# Patient Record
Sex: FEMALE | Race: NATIVE HAWAIIAN OR OTHER PACIFIC ISLANDER
[De-identification: names, ages, dates, MRNs, and addresses within clinical notes are randomized per-mention and may not be internally consistent; named-entity substitution may affect disease eponyms.]

---

## 2021-03-07 ENCOUNTER — HOSPITAL ENCOUNTER (OUTPATIENT)
Dept: HOSPITAL 76 - EMS | Age: 56
Discharge: ANOTHER HOSPITAL | End: 2021-03-07
Payer: MEDICAID

## 2021-03-07 DIAGNOSIS — K92.1: Primary | ICD-10-CM

## 2021-12-17 ENCOUNTER — HOSPITAL ENCOUNTER (OUTPATIENT)
Dept: HOSPITAL 76 - EMS | Age: 56
Discharge: ANOTHER HOSPITAL | End: 2021-12-17
Payer: MEDICAID

## 2021-12-17 ENCOUNTER — HOSPITAL ENCOUNTER (EMERGENCY)
Dept: HOSPITAL 76 - ED | Age: 56
Discharge: HOME | End: 2021-12-17
Payer: MEDICAID

## 2021-12-17 VITALS — DIASTOLIC BLOOD PRESSURE: 65 MMHG | SYSTOLIC BLOOD PRESSURE: 118 MMHG

## 2021-12-17 DIAGNOSIS — C50.919: ICD-10-CM

## 2021-12-17 DIAGNOSIS — C78.00: ICD-10-CM

## 2021-12-17 DIAGNOSIS — C79.31: ICD-10-CM

## 2021-12-17 DIAGNOSIS — K94.23: Primary | ICD-10-CM

## 2021-12-17 DIAGNOSIS — E86.0: ICD-10-CM

## 2021-12-17 DIAGNOSIS — D64.9: ICD-10-CM

## 2021-12-17 DIAGNOSIS — D72.829: ICD-10-CM

## 2021-12-17 LAB
ALBUMIN DIAFP-MCNC: 3.3 G/DL (ref 3.2–5.5)
ALBUMIN/GLOB SERPL: 0.9 {RATIO} (ref 1–2.2)
ALP SERPL-CCNC: 126 IU/L (ref 42–121)
ALT SERPL W P-5'-P-CCNC: 25 IU/L (ref 10–60)
ANION GAP SERPL CALCULATED.4IONS-SCNC: 12 MMOL/L (ref 6–13)
AST SERPL W P-5'-P-CCNC: 35 IU/L (ref 10–42)
BASOPHILS NFR BLD AUTO: 0.1 10^3/UL (ref 0–0.1)
BASOPHILS NFR BLD AUTO: 0.3 %
BILIRUB BLD-MCNC: 0.4 MG/DL (ref 0.2–1)
BUN SERPL-MCNC: 46 MG/DL (ref 6–20)
CALCIUM UR-MCNC: 8.6 MG/DL (ref 8.5–10.3)
CHLORIDE SERPL-SCNC: 98 MMOL/L (ref 101–111)
CLARITY UR REFRACT.AUTO: CLEAR
CO2 SERPL-SCNC: 25 MMOL/L (ref 21–32)
CREAT SERPLBLD-SCNC: 1.4 MG/DL (ref 0.4–1)
EOSINOPHIL # BLD AUTO: 0.2 10^3/UL (ref 0–0.7)
EOSINOPHIL NFR BLD AUTO: 0.7 %
ERYTHROCYTE [DISTWIDTH] IN BLOOD BY AUTOMATED COUNT: 19.6 % (ref 12–15)
GFRSERPLBLD MDRD-ARVRAT: 39 ML/MIN/{1.73_M2} (ref 89–?)
GLOBULIN SER-MCNC: 3.8 G/DL (ref 2.1–4.2)
GLUCOSE SERPL-MCNC: 139 MG/DL (ref 70–100)
GLUCOSE UR QL STRIP.AUTO: NEGATIVE MG/DL
HCT VFR BLD AUTO: 24.2 % (ref 37–47)
HGB UR QL STRIP: 7.8 G/DL (ref 12–16)
KETONES UR QL STRIP.AUTO: NEGATIVE MG/DL
LIPASE SERPL-CCNC: 116 U/L (ref 22–51)
LYMPHOCYTES # SPEC AUTO: 0.8 10^3/UL (ref 1.5–3.5)
LYMPHOCYTES NFR BLD AUTO: 2.9 %
MCH RBC QN AUTO: 32.1 PG (ref 27–31)
MCHC RBC AUTO-ENTMCNC: 32.2 G/DL (ref 32–36)
MCV RBC AUTO: 99.6 FL (ref 81–99)
MONOCYTES # BLD AUTO: 1.3 10^3/UL (ref 0–1)
MONOCYTES NFR BLD AUTO: 4.7 %
NEUTROPHILS # BLD AUTO: 25.1 10^3/UL (ref 1.5–6.6)
NEUTROPHILS # SNV AUTO: 28.1 X10^3/UL (ref 4.8–10.8)
NEUTROPHILS NFR BLD AUTO: 89.3 %
NITRITE UR QL STRIP.AUTO: NEGATIVE
NRBC # BLD AUTO: 0.05 X10^3/UL
NRBC # BLD AUTO: 0.2 /100WBC
PDW BLD AUTO: 9.7 FL (ref 7.9–10.8)
PH UR STRIP.AUTO: 7 PH (ref 5–7.5)
PLAT MORPH BLD: (no result)
PLATELET # BLD: 521 10^3/UL (ref 130–450)
PLATELET BLD QL SMEAR: (no result)
POTASSIUM SERPL-SCNC: 4 MMOL/L (ref 3.5–5)
PROT SPEC-MCNC: 7.1 G/DL (ref 6.7–8.2)
PROT UR STRIP.AUTO-MCNC: NEGATIVE MG/DL
RBC # UR STRIP.AUTO: NEGATIVE /UL
RBC MAR: 2.43 10^6/UL (ref 4.2–5.4)
RBC MORPH BLD: (no result)
SODIUM SERPLBLD-SCNC: 135 MMOL/L (ref 135–145)
SP GR UR STRIP.AUTO: 1.01 (ref 1–1.03)
SQUAMOUS URNS QL MICRO: (no result)
UROBILINOGEN UR QL STRIP.AUTO: (no result) E.U./DL
UROBILINOGEN UR STRIP.AUTO-MCNC: NEGATIVE MG/DL
WBC # UR MANUAL: (no result) /HPF (ref 0–5)

## 2021-12-17 PROCEDURE — 81003 URINALYSIS AUTO W/O SCOPE: CPT

## 2021-12-17 PROCEDURE — 96361 HYDRATE IV INFUSION ADD-ON: CPT

## 2021-12-17 PROCEDURE — 99284 EMERGENCY DEPT VISIT MOD MDM: CPT

## 2021-12-17 PROCEDURE — 83605 ASSAY OF LACTIC ACID: CPT

## 2021-12-17 PROCEDURE — 87086 URINE CULTURE/COLONY COUNT: CPT

## 2021-12-17 PROCEDURE — 74177 CT ABD & PELVIS W/CONTRAST: CPT

## 2021-12-17 PROCEDURE — 71045 X-RAY EXAM CHEST 1 VIEW: CPT

## 2021-12-17 PROCEDURE — 74019 RADEX ABDOMEN 2 VIEWS: CPT

## 2021-12-17 PROCEDURE — 85025 COMPLETE CBC W/AUTO DIFF WBC: CPT

## 2021-12-17 PROCEDURE — 83690 ASSAY OF LIPASE: CPT

## 2021-12-17 PROCEDURE — 81001 URINALYSIS AUTO W/SCOPE: CPT

## 2021-12-17 PROCEDURE — 80053 COMPREHEN METABOLIC PANEL: CPT

## 2021-12-17 PROCEDURE — 96360 HYDRATION IV INFUSION INIT: CPT

## 2021-12-17 PROCEDURE — 87040 BLOOD CULTURE FOR BACTERIA: CPT

## 2021-12-17 PROCEDURE — 99283 EMERGENCY DEPT VISIT LOW MDM: CPT

## 2021-12-17 PROCEDURE — 36415 COLL VENOUS BLD VENIPUNCTURE: CPT

## 2021-12-17 NOTE — CT REPORT
PROCEDURE:  Abdomen/Pelvis W

 

INDICATIONS:  leukocytosis; peg placement

 

CONTRAST:  IV CONTRAST: Isovue 300 ml: 80 PO CONTRAST: *NO PO CONTRAST 

 

TECHNIQUE:  

After the administration of oral and intravenous contrast, 5 mm thick sections acquired from the diap
hragms to the symphysis.  5 mm thick coronal and sagittal reformats were acquired.  For radiation dos
e reduction, the following was used:  automated exposure control, adjustment of mA and/or kV accordin
g to patient size.  

 

COMPARISON:  None.

 

FINDINGS:

 

Inferior chest:  No focal consolidation, pleural effusion, or pneumothorax. No cardiomegaly or perica
rdial effusion.  

 

Gallbladder:  Cholecystectomy.

 

Biliary tree: Minimal intrahepatic biliary ductal dilatation.

 

Liver: The liver demonstrates normal enhancement, size, and contour. 

 

Spleen: Normal enhancement, size and morphology is seen. 

 

Pancreas: No contour deforming mass or inflammatory change.

 

Adrenals: Normal size without masses.

 

Kidneys/ureters: Normal size and morphology. No solid masses or hydronephrosis. 

 

Vasculature: No evidence of aneurysm or other significant vascular pathology.   

 

Lymphatic system: No pathologic enlargement by size criteria.

 

GI/mesentery: A percutaneous gastrostomy tube is seen within the stomach. No evidence of intestinal o
bstruction. Colonic diverticulosis.

Normal appearance of the appendix.

 

Peritoneum/Retroperitoneum: No free intraperitoneal gas or large collection.

 

Urinary bladder: The urinary bladder is distended with a smooth thin wall. 

 

Pelvic organs: No significant abnormality. 

 

Bones/soft tissues: Mild compression deformity of the L1 superior endplate, age indeterminate.

 

IMPRESSION:

1.No significant abnormality.

 

Reviewed by: David Feliz MD on 12/17/2021 7:50 PM PST

Approved by: David Feliz MD on 12/17/2021 7:50 PM PST

 

 

Station ID:  [id number]

## 2021-12-17 NOTE — ED PHYSICIAN DOCUMENTATION
History of Present Illness





- Stated complaint


Stated Complaint: FEEDING TUBE ISSUES





- Chief complaint


Chief Complaint: General





- Additonal information


Additional information: 


56-year-old female is brought to the emergency department for evaluation of PEG 

tube dysfunction.  She is an active cancer patient undergoing current 

chemotherapy and radiation.  She had this right-sided PEG tube placed at [facility] 12/03/21. Her previous one stopped working normally.  She 

receives bolus feedings 3 times a day.  She reports that initially when the tube

was placed she had bleeding.  However today has had leakage of tube feeding arou

nd the site when the feeds were going in.  Reports that the site remains tender,

but not much more so than when it was placed initially





Also reports that she is taking keflex for a UTI.  last chemotherapy 12/16/21.  

She did receive nupogen post treatment.  Denies any fevers.  no vomiting








Has a history of breast and lung cancer with mets to the brain.  Undergoing 

current chemotherapy through [facility].  Oncologist is Dr. Kwon.








Review of Systems


Constitutional: denies: Fever, Chills


Eyes: reports: Reviewed and negative


Ears: reports: Reviewed and negative


Nose: reports: Rhinorrhea / runny nose


Throat: reports: Reviewed and negative


Cardiac: reports: Reviewed and negative


Respiratory: reports: Reviewed and negative


GI: reports: Abdominal Pain, Other (PEG tube dysfunction)


: reports: Reviewed and negative


Skin: reports: Reviewed and negative


Musculoskeletal: reports: Reviewed and negative


Neurologic: reports: Reviewed and negative


Psychiatric: reports: Reviewed and negative





PD PAST MEDICAL HISTORY





- Present Medications


Home Medications: 


                                Ambulatory Orders











 Medication  Instructions  Recorded  Confirmed


 


Amox/Clav 875/125 [Augmentin] 1 each PO Q12H #14 tablet 12/17/21 














- Allergies


Allergies/Adverse Reactions: 


                                    Allergies











Allergy/AdvReac Type Severity Reaction Status Date / Time


 


morphine Allergy  Anaphylaxis Verified 12/17/21 17:20














PD ED PE EXPANDED





- General


General: Alert.  No: Well developed/nourished (Thin cachectic chronic ill ap

pearance.  Alopecia)





- Cardiac


Cardiac: Regular Rate, Radial strong equal, Pedal strong equal, Cap refill < 2 

sec





- Respiratory


Respiratory: Clear to ausultation viki.  No: Distress, Labored





- Abdomen


Abdomen: Normal Bowel sounds, Tender to palpation, Surgical scars, Other 

(Left-sided PEG stoma clean dry and intact.  Right-sided PEG inserted to 4 cm.  

Moderate amount of tube feed seen exiting at the stoma site)





- Neuro


Neuro: Alert and Oriented X 3, CNII-XII intact





- GCS


Eye Opening: Spontaneous


Motor: Obeys Commands


Verbal: Oriented


Total: 15





Results





- Vitals


Vitals: 


                               Vital Signs - 24 hr











  12/17/21 12/17/21





  17:20 19:39


 


Temperature 37.3 C 37.5 C


 


Heart Rate 60 90


 


Respiratory 16 16





Rate  


 


Blood Pressure 129/73 120/68


 


O2 Saturation 99 100








                                     Oxygen











O2 Source                      Room air

















- Labs


Labs: 


                                Laboratory Tests











  12/17/21 12/17/21 12/17/21





  17:49 17:49 18:37


 


WBC  28.1 H  


 


RBC  2.43 L  


 


Hgb  7.8 L  


 


Hct  24.2 L  


 


MCV  99.6 H  


 


MCH  32.1 H  


 


MCHC  32.2  


 


RDW  19.6 H  


 


Plt Count  521 H  


 


MPV  9.7  


 


Neut # (Auto)  25.1 H  


 


Lymph # (Auto)  0.8 L  


 


Mono # (Auto)  1.3 H  


 


Eos # (Auto)  0.2  


 


Baso # (Auto)  0.1  


 


Absolute Nucleated RBC  0.05  


 


Nucleated RBC %  0.2  


 


Manual Slide Review  Indicated  


 


Platelet Estimate  INCREASED (>450,000)  


 


Platelet Morphology  1+ GIANT PLATELETS  


 


RBC Morph Micro Appear  NORMAL APPEARANCE  


 


Sodium   135 


 


Potassium   4.0 


 


Chloride   98 L 


 


Carbon Dioxide   25 


 


Anion Gap   12.0 


 


BUN   46 H 


 


Creatinine   1.4 H 


 


Estimated GFR (MDRD)   39 L 


 


Glucose   139 H 


 


Lactic Acid    2.0


 


Calcium   8.6 


 


Total Bilirubin   0.4 


 


AST   35 


 


ALT   25 


 


Alkaline Phosphatase   126 H 


 


Total Protein   7.1 


 


Albumin   3.3 


 


Globulin   3.8 


 


Albumin/Globulin Ratio   0.9 L 


 


Lipase   116 H 


 


Urine Color   


 


Urine Clarity   


 


Urine pH   


 


Ur Specific Gravity   


 


Urine Protein   


 


Urine Glucose (UA)   


 


Urine Ketones   


 


Urine Occult Blood   


 


Urine Nitrite   


 


Urine Bilirubin   


 


Urine Urobilinogen   


 


Ur Leukocyte Esterase   


 


Urine RBC   


 


Urine WBC   


 


Ur Squamous Epith Cells   


 


Urine Bacteria   


 


Ur Microscopic Review   


 


Urine Culture Comments   














  12/17/21





  18:58


 


WBC 


 


RBC 


 


Hgb 


 


Hct 


 


MCV 


 


MCH 


 


MCHC 


 


RDW 


 


Plt Count 


 


MPV 


 


Neut # (Auto) 


 


Lymph # (Auto) 


 


Mono # (Auto) 


 


Eos # (Auto) 


 


Baso # (Auto) 


 


Absolute Nucleated RBC 


 


Nucleated RBC % 


 


Manual Slide Review 


 


Platelet Estimate 


 


Platelet Morphology 


 


RBC Morph Micro Appear 


 


Sodium 


 


Potassium 


 


Chloride 


 


Carbon Dioxide 


 


Anion Gap 


 


BUN 


 


Creatinine 


 


Estimated GFR (MDRD) 


 


Glucose 


 


Lactic Acid 


 


Calcium 


 


Total Bilirubin 


 


AST 


 


ALT 


 


Alkaline Phosphatase 


 


Total Protein 


 


Albumin 


 


Globulin 


 


Albumin/Globulin Ratio 


 


Lipase 


 


Urine Color  YELLOW


 


Urine Clarity  CLEAR


 


Urine pH  7.0


 


Ur Specific Gravity  1.010


 


Urine Protein  NEGATIVE


 


Urine Glucose (UA)  NEGATIVE


 


Urine Ketones  NEGATIVE


 


Urine Occult Blood  NEGATIVE


 


Urine Nitrite  NEGATIVE


 


Urine Bilirubin  NEGATIVE


 


Urine Urobilinogen  0.2 (NORMAL)


 


Ur Leukocyte Esterase  SMALL H


 


Urine RBC  None Seen


 


Urine WBC  6-10 H


 


Ur Squamous Epith Cells  RARE Squamous


 


Urine Bacteria  None Seen


 


Ur Microscopic Review  INDICATED


 


Urine Culture Comments  INDICATED














- Rads (name of study)


  ** KUB


Radiology: Final report received (Opacification of the stomach, compatible with 

adequate replacement of the patient's percutaneous gastrostomy tube)





  ** CXR


Radiology: Final report received (no acute cardiopulmonary process)





  ** CT abd


Radiology: Final report received (No acute abnormality)





PD MEDICAL DECISION MAKING





- ED course


Complexity details: reviewed results, re-evaluated patient, d/w patient, d/w 

family, d/w consultant (Ortiz (GI at [facility]) diane Oncologist)


ED course: 





56-year-old female who is undergoing active chemotherapy and radiation for lung 

and breast cancer with mets to the brain presents to the emergency department 

for leaking around her PEG tube site which was placed on 3 December at [facility].





X-ray with Gastrografin shows that the PEG tube is in appropriate position.  She

 had very little Gastrografin leaking when it was pushed through the PEG tube. I

 did discuss our concerns with the G-tube leaking with on-call 

gastroenterologist Dr. Alcantar at [facility].  She presented with the G-tube 

at 4 cm.  He did recommend that we gently pull it back to 3 cm which we have 

done at the bedside.  This was well-tolerated by the patient.  He suspects 

additional tension at the PEG tube site will allow the stoma to heal.





Screening labs however showed a marked leukocytosis of 28,000.  Her electrolytes

 indicate mild dehydration with an elevated BUN and creatinine.  She was given a

 liter of crystalloid here in the emergency department.





A subsequent CT of her abdomen was completed and it did not show any acute 

worrisome findings.  Specifically the PEG tube is in place With no findings of 

intestinal obstruction.  Chest x-ray was free of focal opacities.  Blood 

cultures are pending.  Her urine is not consistent with infection.





I did discuss the leukocytosis with her oncologist Dr. Kwon.  He reports that 

when she received chemotherapy 2 days ago she had a white blood cell count of 

17.8.  She also had a modest anemia with a hemoglobin of 8.2.





She had reported to him at that time that she was stumbling and having increased

 difficulty walking.  Due to this concern he increased her Decadron dosing.  He 

suspects that is the cause of the white blood cell count elevation but given the

 concerns for her PEG tube leaking he would recommend starting her on Augmentin.





I have discussed the labs, imaging findings as well as my discussion with GI and

 Dr. Kwon with the patient and her mom and they are comfortable at this time 

with discharge home.  Emergent return precautions were discussed.





Departure





- Departure


Disposition: 01 Home, Self Care


Clinical Impression: 


 Leaking PEG tube, Lung cancer metastatic to brain





Leukocytosis


Qualifiers:


 Leukocytosis type: unspecified Qualified Code(s): D72.829 - Elevated white 

blood cell count, unspecified





Condition: Stable


Record reviewed to determine appropriate education?: Yes


Prescriptions: 


Amox/Clav 875/125 [Augmentin] 1 each PO Q12H #14 tablet


Comments: 


January was seen in the emergency department for concerns of leaking around her 

PEG tube.





The PEG tube is at 3 cm at the skin.  In order to help the tract heal and reduce

 leaking we recommend gentle traction on the tube when feeding.  She may also 

benefit from having the feeds running over a slower amount of time.





If the feeding tube continues to leak she will need to be seen by GI.





The x-ray with Gastrografin as well as the CT of her abdomen showed that the PEG

 tube is in appropriate position





Today her white blood cell count was very elevated at 28,000.  I discussed this 

with her oncologist Dr. Kwon.  When she received chemotherapy on Thursday her 

white count was 17.8.  Because she had reported that she is stumbling more he 

did increase her steroids.  This can cause an increase in her white blood cell 

count.





I am her urine shows no further signs of infection.  Her chest x-ray does not 

show pneumonia.  We do have blood cultures pending and will call you if they are

 positive.





Dr. Kwon does recommend that she take a week of Augmentin.  This is an 

antibiotic that can help cover bacteria associated with the intestines.  If at 

any point you find that her symptoms are worsening, she has fevers, uncontrolled

 vomiting diarrhea, black or bloody stools she is to return immediately to the 

ER.





Your prescriptions have been sent electronically to the [facility]

## 2021-12-17 NOTE — XRAY REPORT
PROCEDURE:  Chest 1 View X-Ray

 

INDICATIONS:  chest pain

 

TECHNIQUE:  One view of the chest was acquired.  

 

COMPARISON:  None.

 

 

FINDINGS: 

SUPPORT DEVICES: Left CT injectable cristal catheter with tip projecting over the atrial region.

 

LUNGS/PLEURA: No focal consolidation, pleural effusion or space-occupying pneumothorax.

 

MEDIASTINUM: The cardiomediastinal silhouette is within normal limits.

 

BONES/SOFT TISSUES: No acute abnormality.

 

IMPRESSION: 

1.No acute cardiopulmonary abnormality.

 

 

 

Reviewed by: David Feliz MD on 12/17/2021 7:51 PM PST

Approved by: David Feliz MD on 12/17/2021 7:51 PM PST

 

 

Station ID:  [id number]

## 2021-12-17 NOTE — XRAY REPORT
PROCEDURE:  Abdomen 2 View X-Ray

 

INDICATIONS:  ? peg tube placement

 

TECHNIQUE:  2 views of the abdomen were acquired.  

 

COMPARISON:  None.

 

FINDINGS:  

Surgical changes and devices: A percutaneous gastrostomy tube is seen.   

A central venous catheter is partially imaged.

 

Bowel: Enteric contrast opacifies the stomach.  The bowel gas pattern is normal.  

 

Soft tissues:  No masses; visualized solid organ contours appear normal in size.  No suspicious abdom
inal calcifications.  

 

Bones:  No suspicious bony abnormalities.  

 

IMPRESSION:  Opacification of the stomach, compatible with adequate replacement of the patient's perc
utaneous gastrostomy tube.

 

Reviewed by: David Feliz MD on 12/17/2021 6:51 PM PST

Approved by: David Feliz MD on 12/17/2021 6:51 PM PST

 

 

Station ID:  [id number]